# Patient Record
(demographics unavailable — no encounter records)

---

## 2025-01-02 NOTE — HISTORY OF PRESENT ILLNESS
[de-identified] : ENIO CONRAD  is a 85 year F here today for RT knee pain.  Pain has been present since 12/29/24 and is located anterior/ posterior.  Injury: NKI  Instability: Yes  Clicking/popping: No  Does knee lock up: No Swelling/effusions: No  Exacerbating activities/motions: Walking,  Previous treatment: Denies any prior treatment for the right knee  Surgery: No  NSAIDs: Tylenol  PT: No  Injections: No  Brace: No  Activity mods: N/A  Occupation: Retired  Recreational Activities: N/A

## 2025-01-02 NOTE — DISCUSSION/SUMMARY
[de-identified] : R knee pain, improving, mild OA on xray  Mobic script Heat/Ice/elevate/rest F/u PRN

## 2025-01-02 NOTE — IMAGING
[de-identified] : Gait: Mildly antalgic Alignment: neutral Scars: none    R Knee: Tenderness: medial/lateral joint line ROM: 5-100 Crepitus: Present Effusion: Present  Warmth: mild   Ligament Exam:  Lachman: neg  Post Drawer: neg  Valgus stress: neg at 0 and 30 degrees Varus stress: neg at 0 and 30 degrees Pivot shift: neg Dial test: neg at 30 degrees, neg at 90 degrees     Strength: Quads: 5/5 Hamstrin/5 DF/PF/EHL 5/5   Sensation grossly intact in all dermatomes, DP/PT 2+, brisk capillary refill distally